# Patient Record
Sex: FEMALE | Race: BLACK OR AFRICAN AMERICAN
[De-identification: names, ages, dates, MRNs, and addresses within clinical notes are randomized per-mention and may not be internally consistent; named-entity substitution may affect disease eponyms.]

---

## 2021-03-10 ENCOUNTER — HOSPITAL ENCOUNTER (EMERGENCY)
Dept: HOSPITAL 19 - COL.ER | Age: 61
Discharge: HOME | End: 2021-03-10
Attending: FAMILY MEDICINE
Payer: MEDICARE

## 2021-03-10 VITALS — TEMPERATURE: 98.5 F

## 2021-03-10 VITALS — HEART RATE: 82 BPM | DIASTOLIC BLOOD PRESSURE: 79 MMHG | SYSTOLIC BLOOD PRESSURE: 145 MMHG

## 2021-03-10 VITALS — HEIGHT: 60.98 IN | WEIGHT: 180.34 LBS | BODY MASS INDEX: 34.05 KG/M2

## 2021-03-10 DIAGNOSIS — R07.9: Primary | ICD-10-CM

## 2021-03-10 DIAGNOSIS — G56.10: ICD-10-CM

## 2021-03-10 DIAGNOSIS — I10: ICD-10-CM

## 2021-03-10 LAB
ALBUMIN SERPL-MCNC: 3.9 GM/DL (ref 3.5–5)
ALP SERPL-CCNC: 83 U/L (ref 50–136)
ALT SERPL-CCNC: 30 U/L (ref 4–34)
ANION GAP SERPL CALC-SCNC: 8 MMOL/L (ref 7–16)
AST SERPL-CCNC: 36 U/L (ref 15–37)
BASOPHILS # BLD: 0 10*3/UL (ref 0–0.2)
BASOPHILS NFR BLD AUTO: 0.5 % (ref 0–2)
BILIRUB SERPL-MCNC: 0.3 MG/DL (ref 0–1)
BUN SERPL-MCNC: 23 MG/DL (ref 7–17)
CALCIUM SERPL-MCNC: 9.2 MG/DL (ref 8.4–10.2)
CHLORIDE SERPL-SCNC: 110 MMOL/L (ref 98–107)
CO2 SERPL-SCNC: 24 MMOL/L (ref 22–30)
CREAT SERPL-SCNC: 0.98 UMOL/L (ref 0.52–1.25)
CRP SERPL-MCNC: < 0.5 MG/DL (ref 0–0.9)
EOSINOPHIL # BLD: 0.3 10*3/UL (ref 0–0.7)
EOSINOPHIL NFR BLD: 3.7 % (ref 0–4)
ERYTHROCYTE [DISTWIDTH] IN BLOOD BY AUTOMATED COUNT: 14 % (ref 11.5–14.5)
GLUCOSE SERPL-MCNC: 107 MG/DL (ref 74–106)
GRANULOCYTES # BLD AUTO: 36.9 % (ref 42.2–75.2)
HCT VFR BLD AUTO: 36 % (ref 37–47)
HGB BLD-MCNC: 11.2 G/DL (ref 12.5–16)
LYMPHOCYTES # BLD: 3.9 10*3/UL (ref 1.2–3.4)
LYMPHOCYTES NFR BLD: 49.7 % (ref 20–51)
MCH RBC QN AUTO: 27 PG (ref 27–31)
MCHC RBC AUTO-ENTMCNC: 31 G/DL (ref 33–37)
MCV RBC AUTO: 88 FL (ref 80–100)
MONOCYTES # BLD: 0.7 10*3/UL (ref 0.1–0.6)
MONOCYTES NFR BLD AUTO: 8.8 % (ref 1.7–9.3)
NEUTROPHILS # BLD: 2.9 10*3/UL (ref 1.4–6.5)
PLATELET # BLD AUTO: 239 K/MM3 (ref 130–400)
PMV BLD AUTO: 11.7 FL (ref 7.4–10.4)
POTASSIUM SERPL-SCNC: 4.1 MMOL/L (ref 3.4–5)
PROT SERPL-MCNC: 7.2 GM/DL (ref 6.4–8.2)
RBC # BLD AUTO: 4.1 M/MM3 (ref 4.1–5.3)
SODIUM SERPL-SCNC: 142 MMOL/L (ref 137–145)

## 2022-02-03 ENCOUNTER — HOSPITAL ENCOUNTER (EMERGENCY)
Dept: HOSPITAL 19 - COL.ER | Age: 62
Discharge: HOME | End: 2022-02-03
Attending: EMERGENCY MEDICINE
Payer: MEDICARE

## 2022-02-03 VITALS — HEART RATE: 81 BPM | SYSTOLIC BLOOD PRESSURE: 156 MMHG | DIASTOLIC BLOOD PRESSURE: 77 MMHG

## 2022-02-03 VITALS — TEMPERATURE: 97.8 F

## 2022-02-03 VITALS — BODY MASS INDEX: 38.18 KG/M2 | HEIGHT: 59.02 IN | WEIGHT: 189.38 LBS

## 2022-02-03 DIAGNOSIS — M54.50: ICD-10-CM

## 2022-02-03 DIAGNOSIS — Z79.899: ICD-10-CM

## 2022-02-03 DIAGNOSIS — R10.32: ICD-10-CM

## 2022-02-03 DIAGNOSIS — I10: ICD-10-CM

## 2022-02-03 DIAGNOSIS — R11.0: ICD-10-CM

## 2022-02-03 DIAGNOSIS — R10.31: Primary | ICD-10-CM

## 2022-02-03 DIAGNOSIS — K21.9: ICD-10-CM

## 2022-02-03 LAB
ALBUMIN SERPL-MCNC: 3.9 GM/DL (ref 3.5–5)
ALP SERPL-CCNC: 61 U/L (ref 40–150)
ALT SERPL-CCNC: 41 U/L (ref 0–55)
ANION GAP SERPL CALC-SCNC: 10 MMOL/L (ref 7–16)
AST SERPL-CCNC: 34 U/L (ref 5–34)
BASOPHILS # BLD: 0 K/MM3 (ref 0–0.2)
BASOPHILS NFR BLD AUTO: 0.1 % (ref 0–2)
BILIRUB SERPL-MCNC: 0.6 MG/DL (ref 0.2–1.2)
BUN SERPL-MCNC: 13 MG/DL (ref 10–20)
CALCIUM SERPL-MCNC: 8.9 MG/DL (ref 8.4–10.2)
CHLORIDE SERPL-SCNC: 106 MMOL/L (ref 98–107)
CO2 SERPL-SCNC: 22 MMOL/L (ref 22–29)
CREAT SERPL-SCNC: 0.9 MG/DL (ref 0.57–1.11)
CRP SERPL-MCNC: 0.45 MG/DL (ref 0–0.5)
EOSINOPHIL # BLD: 0.2 K/MM3 (ref 0–0.7)
EOSINOPHIL NFR BLD: 2.3 % (ref 0–4)
ERYTHROCYTE [DISTWIDTH] IN BLOOD BY AUTOMATED COUNT: 14.4 % (ref 11.5–14.5)
GLUCOSE SERPL-MCNC: 112 MG/DL (ref 70–99)
GRANULOCYTES # BLD AUTO: 61.7 % (ref 42.2–75.2)
HCT VFR BLD AUTO: 42.2 % (ref 37–47)
HGB BLD-MCNC: 13.1 G/DL (ref 12.5–16)
LIPASE SERPL-CCNC: 66 U/L (ref 8–78)
LYMPHOCYTES # BLD: 3.3 K/MM3 (ref 1.2–3.4)
LYMPHOCYTES NFR BLD: 31 % (ref 20–51)
MCH RBC QN AUTO: 27 PG (ref 27–31)
MCHC RBC AUTO-ENTMCNC: 31 G/DL (ref 33–37)
MCV RBC AUTO: 86 FL (ref 80–100)
MONOCYTES # BLD: 0.5 K/MM3 (ref 0.1–0.6)
MONOCYTES NFR BLD AUTO: 4.6 % (ref 1.7–9.3)
NEUTROPHILS # BLD: 6.5 K/MM3 (ref 1.4–6.5)
PH UR STRIP.AUTO: 5 [PH] (ref 5–8)
PLATELET # BLD AUTO: 234 K/MM3 (ref 130–400)
PMV BLD AUTO: 11.6 FL (ref 7.4–10.4)
POTASSIUM SERPL-SCNC: 4 MMOL/L (ref 3.5–4.5)
PROT SERPL-MCNC: 8.2 GM/DL (ref 6.2–8.1)
RBC # BLD AUTO: 4.91 M/MM3 (ref 4.1–5.3)
RBC # UR: (no result) /HPF (ref 0–2)
SODIUM SERPL-SCNC: 138 MMOL/L (ref 136–145)
SP GR UR STRIP.AUTO: 1.01 (ref 1–1.03)
SQUAMOUS # URNS: (no result) /HPF (ref 0–10)
URINE LEUKOCYTE ESTERASE: (no result)
URN COLLECT METHOD CLASS: (no result)

## 2022-05-13 ENCOUNTER — HOSPITAL ENCOUNTER (OUTPATIENT)
Dept: HOSPITAL 19 - COL.ER | Age: 62
Setting detail: OBSERVATION
LOS: 3 days | Discharge: HOME | End: 2022-05-16
Attending: INTERNAL MEDICINE | Admitting: INTERNAL MEDICINE
Payer: MEDICARE

## 2022-05-13 VITALS — BODY MASS INDEX: 34.93 KG/M2 | WEIGHT: 177.91 LBS | HEIGHT: 60 IN

## 2022-05-13 VITALS — SYSTOLIC BLOOD PRESSURE: 145 MMHG | DIASTOLIC BLOOD PRESSURE: 69 MMHG | HEART RATE: 78 BPM | TEMPERATURE: 98.5 F

## 2022-05-13 VITALS — HEART RATE: 67 BPM | DIASTOLIC BLOOD PRESSURE: 64 MMHG | TEMPERATURE: 97.9 F | SYSTOLIC BLOOD PRESSURE: 124 MMHG

## 2022-05-13 DIAGNOSIS — M54.50: ICD-10-CM

## 2022-05-13 DIAGNOSIS — Z91.19: ICD-10-CM

## 2022-05-13 DIAGNOSIS — Z28.310: ICD-10-CM

## 2022-05-13 DIAGNOSIS — I50.30: ICD-10-CM

## 2022-05-13 DIAGNOSIS — Z79.82: ICD-10-CM

## 2022-05-13 DIAGNOSIS — I11.0: ICD-10-CM

## 2022-05-13 DIAGNOSIS — I21.4: Primary | ICD-10-CM

## 2022-05-13 DIAGNOSIS — I42.2: ICD-10-CM

## 2022-05-13 DIAGNOSIS — K21.9: ICD-10-CM

## 2022-05-13 DIAGNOSIS — E78.5: ICD-10-CM

## 2022-05-13 DIAGNOSIS — Z28.9: ICD-10-CM

## 2022-05-13 DIAGNOSIS — Z79.899: ICD-10-CM

## 2022-05-13 DIAGNOSIS — I16.0: ICD-10-CM

## 2022-05-13 DIAGNOSIS — G89.29: ICD-10-CM

## 2022-05-13 LAB
ALBUMIN SERPL-MCNC: 3.6 GM/DL (ref 3.5–5)
ALP SERPL-CCNC: 57 U/L (ref 40–150)
ALT SERPL-CCNC: 21 U/L (ref 0–55)
ANION GAP SERPL CALC-SCNC: 12 MMOL/L (ref 7–16)
APTT PPP: 29.5 SECONDS (ref 26–37)
AST SERPL-CCNC: 29 U/L (ref 5–34)
BILIRUB SERPL-MCNC: 0.6 MG/DL (ref 0.2–1.2)
BUN SERPL-MCNC: 18 MG/DL (ref 10–20)
CALCIUM SERPL-MCNC: 9.2 MG/DL (ref 8.4–10.2)
CHLORIDE SERPL-SCNC: 105 MMOL/L (ref 98–107)
CO2 SERPL-SCNC: 23 MMOL/L (ref 22–29)
CREAT SERPL-SCNC: 0.75 MG/DL (ref 0.57–1.11)
CRP SERPL-MCNC: 0.28 MG/DL (ref 0–0.5)
DEPRECATED D DIMER PPP IA-ACNC: < 200 NG/MLDDU (ref 200–230)
EOSINOPHIL NFR BLD: 1 % (ref 0–4)
ERYTHROCYTE [DISTWIDTH] IN BLOOD BY AUTOMATED COUNT: 14.3 % (ref 11.5–14.5)
GLUCOSE SERPL-MCNC: 94 MG/DL (ref 70–99)
HCT VFR BLD AUTO: 37 % (ref 37–47)
HGB BLD-MCNC: 12.1 G/DL (ref 12.5–16)
INR BLD: 1.1 (ref 0.8–3)
LYMPHOCYTES NFR BLD MANUAL: 56 % (ref 20–51)
MCH RBC QN AUTO: 28 PG (ref 27–31)
MCHC RBC AUTO-ENTMCNC: 33 G/DL (ref 33–37)
MCV RBC AUTO: 84 FL (ref 80–100)
MONOCYTES NFR BLD: 4 % (ref 1.7–9.3)
NEUTS SEG NFR BLD MANUAL: 39 % (ref 42–75.2)
PLATELET # BLD AUTO: 265 K/MM3 (ref 130–400)
PLATELET BLD QL SMEAR: NORMAL
PMV BLD AUTO: 11.5 FL (ref 7.4–10.4)
POTASSIUM SERPL-SCNC: 3.8 MMOL/L (ref 3.5–4.5)
PROT SERPL-MCNC: 8 GM/DL (ref 6.2–8.1)
PROTHROMBIN TIME: 12.9 SECONDS (ref 9.7–12.8)
RBC # BLD AUTO: 4.4 M/MM3 (ref 4.1–5.3)
SODIUM SERPL-SCNC: 140 MMOL/L (ref 136–145)
TROPONIN I SERPL-MCNC: 0.14 NG/ML (ref 0–0.03)

## 2022-05-13 PROCEDURE — G0378 HOSPITAL OBSERVATION PER HR: HCPCS

## 2022-05-13 PROCEDURE — C9113 INJ PANTOPRAZOLE SODIUM, VIA: HCPCS

## 2022-05-14 VITALS — SYSTOLIC BLOOD PRESSURE: 133 MMHG | DIASTOLIC BLOOD PRESSURE: 61 MMHG | TEMPERATURE: 98.6 F | HEART RATE: 59 BPM

## 2022-05-14 VITALS — DIASTOLIC BLOOD PRESSURE: 72 MMHG | TEMPERATURE: 97.9 F | SYSTOLIC BLOOD PRESSURE: 154 MMHG | HEART RATE: 67 BPM

## 2022-05-14 VITALS — DIASTOLIC BLOOD PRESSURE: 72 MMHG | TEMPERATURE: 97.6 F | HEART RATE: 85 BPM | SYSTOLIC BLOOD PRESSURE: 126 MMHG

## 2022-05-14 VITALS — SYSTOLIC BLOOD PRESSURE: 154 MMHG | TEMPERATURE: 97.4 F | DIASTOLIC BLOOD PRESSURE: 72 MMHG | HEART RATE: 74 BPM

## 2022-05-14 VITALS — SYSTOLIC BLOOD PRESSURE: 110 MMHG | TEMPERATURE: 97.8 F | DIASTOLIC BLOOD PRESSURE: 53 MMHG | HEART RATE: 56 BPM

## 2022-05-14 VITALS — HEART RATE: 73 BPM | DIASTOLIC BLOOD PRESSURE: 82 MMHG | SYSTOLIC BLOOD PRESSURE: 135 MMHG | TEMPERATURE: 98.1 F

## 2022-05-14 VITALS — TEMPERATURE: 98.3 F | SYSTOLIC BLOOD PRESSURE: 151 MMHG | DIASTOLIC BLOOD PRESSURE: 66 MMHG | HEART RATE: 62 BPM

## 2022-05-14 LAB
ANION GAP SERPL CALC-SCNC: 12 MMOL/L (ref 7–16)
BASOPHILS # BLD: 0 K/MM3 (ref 0–0.2)
BASOPHILS NFR BLD AUTO: 0.5 % (ref 0–2)
BUN SERPL-MCNC: 14 MG/DL (ref 10–20)
CALCIUM SERPL-MCNC: 8.5 MG/DL (ref 8.4–10.2)
CHLORIDE SERPL-SCNC: 106 MMOL/L (ref 98–107)
CO2 SERPL-SCNC: 21 MMOL/L (ref 22–29)
CREAT SERPL-SCNC: 0.81 MG/DL (ref 0.57–1.11)
EOSINOPHIL # BLD: 0.1 K/MM3 (ref 0–0.7)
EOSINOPHIL NFR BLD: 1.8 % (ref 0–4)
ERYTHROCYTE [DISTWIDTH] IN BLOOD BY AUTOMATED COUNT: 14.6 % (ref 11.5–14.5)
GLUCOSE SERPL-MCNC: 134 MG/DL (ref 70–99)
GRANULOCYTES # BLD AUTO: 37.9 % (ref 42.2–75.2)
HCT VFR BLD AUTO: 37.3 % (ref 37–47)
HGB BLD-MCNC: 11.3 G/DL (ref 12.5–16)
LYMPHOCYTES # BLD: 3.8 K/MM3 (ref 1.2–3.4)
LYMPHOCYTES NFR BLD: 52.1 % (ref 20–51)
MCH RBC QN AUTO: 28 PG (ref 27–31)
MCHC RBC AUTO-ENTMCNC: 30 G/DL (ref 33–37)
MCV RBC AUTO: 92 FL (ref 80–100)
MONOCYTES # BLD: 0.5 K/MM3 (ref 0.1–0.6)
MONOCYTES NFR BLD AUTO: 7.4 % (ref 1.7–9.3)
NEUTROPHILS # BLD: 2.8 K/MM3 (ref 1.4–6.5)
PLATELET # BLD AUTO: 233 K/MM3 (ref 130–400)
PMV BLD AUTO: 12.3 FL (ref 7.4–10.4)
POTASSIUM SERPL-SCNC: 3.5 MMOL/L (ref 3.5–4.5)
RBC # BLD AUTO: 4.07 M/MM3 (ref 4.1–5.3)
SODIUM SERPL-SCNC: 139 MMOL/L (ref 136–145)

## 2022-05-14 NOTE — NUR
Sitting up in bed eating dinner. Complained of chest pain x1. Received nitro
x1 with a little relief. Received protonix and tums with pain resolution.
Heparin gtt continues to infuse @7ml/hr. Denies current needs.  Call light in
reach. Will monitor.

## 2022-05-14 NOTE — NUR
Patient is resting in bed, alert and oriented x 4, VSS. Denies chest pain at
the moment. Telemetry in place NSR. Hep gtt 7ml/hr At goal. Assessment
completed, meds provide no other needs at this time. Call light within reach

## 2022-05-14 NOTE — NUR
Called with c/o chest pain. Rating pain 6/10 to mid sternum-described as sharp
pains. VS WNL. EKG complete and on chart. Spoke with Dr Sow and new orders
received and initiated.

## 2022-05-15 VITALS — TEMPERATURE: 98.5 F | DIASTOLIC BLOOD PRESSURE: 63 MMHG | SYSTOLIC BLOOD PRESSURE: 125 MMHG | HEART RATE: 63 BPM

## 2022-05-15 VITALS — TEMPERATURE: 98.3 F | DIASTOLIC BLOOD PRESSURE: 68 MMHG | SYSTOLIC BLOOD PRESSURE: 144 MMHG | HEART RATE: 62 BPM

## 2022-05-15 VITALS — HEART RATE: 78 BPM | TEMPERATURE: 98.1 F | DIASTOLIC BLOOD PRESSURE: 88 MMHG | SYSTOLIC BLOOD PRESSURE: 151 MMHG

## 2022-05-15 VITALS — TEMPERATURE: 97.8 F | HEART RATE: 63 BPM | DIASTOLIC BLOOD PRESSURE: 67 MMHG | SYSTOLIC BLOOD PRESSURE: 108 MMHG

## 2022-05-15 VITALS — DIASTOLIC BLOOD PRESSURE: 48 MMHG | HEART RATE: 61 BPM | TEMPERATURE: 99.4 F | SYSTOLIC BLOOD PRESSURE: 116 MMHG

## 2022-05-15 VITALS — DIASTOLIC BLOOD PRESSURE: 74 MMHG | TEMPERATURE: 98.3 F | HEART RATE: 79 BPM | SYSTOLIC BLOOD PRESSURE: 157 MMHG

## 2022-05-15 NOTE — NUR
Assessment complete. A&Ox3. Denies pain/chest pain/nausea/shortness of breath.
VS remain stable. Tele reporting SB. Right forearm IV with Heparin infusing at
700units/hour. Left forearm INT flushes without difficutly. Plan of care
discussed for this shift to inclue meds/heparin gtt/calling for
questions/concerns. Verbalizes understanding. Call light in reach. Will
monitor. ADMIT

## 2022-05-15 NOTE — NUR
Called with c/o heartburn from lunch meal. Mylanta given per dr order. Denies
chest pain/nausea/shortness of breath. Denies current needs. Call light in
reach. Will monitor.

## 2022-05-15 NOTE — NUR
Patient had an unevenful day. Denied pain/chest pain/shortness of
breath/nausea. VS remained stable. Heparin infusing @600units/hour to right
forearm IV without difficulty. Next HepXa due at 1900. Discussed NPO at
midnight tonight for cards procedure tomorrow. Denies questions/concerns. Call
light in reach. Will monitor.

## 2022-05-16 VITALS — HEART RATE: 55 BPM | TEMPERATURE: 98 F | DIASTOLIC BLOOD PRESSURE: 67 MMHG | SYSTOLIC BLOOD PRESSURE: 104 MMHG

## 2022-05-16 VITALS — TEMPERATURE: 98.4 F | HEART RATE: 60 BPM | SYSTOLIC BLOOD PRESSURE: 134 MMHG | DIASTOLIC BLOOD PRESSURE: 66 MMHG

## 2022-05-16 VITALS — HEART RATE: 57 BPM | SYSTOLIC BLOOD PRESSURE: 131 MMHG | TEMPERATURE: 98.6 F | DIASTOLIC BLOOD PRESSURE: 65 MMHG

## 2022-05-16 LAB
ANION GAP SERPL CALC-SCNC: 11 MMOL/L (ref 7–16)
BUN SERPL-MCNC: 10 MG/DL (ref 10–20)
CALCIUM SERPL-MCNC: 8.6 MG/DL (ref 8.4–10.2)
CHLORIDE SERPL-SCNC: 106 MMOL/L (ref 98–107)
CO2 SERPL-SCNC: 25 MMOL/L (ref 22–29)
CREAT SERPL-SCNC: 0.88 MG/DL (ref 0.57–1.11)
GLUCOSE SERPL-MCNC: 105 MG/DL (ref 70–99)
MAGNESIUM SERPL-MCNC: 2 MG/DL (ref 1.6–2.6)
POTASSIUM SERPL-SCNC: 4.2 MMOL/L (ref 3.5–4.5)
SODIUM SERPL-SCNC: 142 MMOL/L (ref 136–145)

## 2022-05-16 NOTE — NUR
The patient is to discharge back home today, 5/16. SW met with the patient and
presented and read the IM form outloud to the patient. The patient verbalized
understanding and of discharge today. She signed the form and declined a copy.
No additional needs at this time.

## 2022-05-16 NOTE — NUR
PT LAYING SUPINE IN BED. PT STATES THAT SHE IS NOT WANTING TO TAKE ANY OF HER
MEDICATIONS THIS AM BECAUSE SHE HAS BEEN NPO AND DOES NOT WANT TO TAKE THEM ON
AN EMPTY STOMACH SO THEY DO NOT CAUSE ANY N/V. MEDICATIONS WERE HELD PER HER
REQUEST. PT STATES THAT SHE IS NOT HAVING ANY CHEST PAIN OR DISCOMFORT AT THIS
TIME. NO SOB OR DIZZINES. "IM JUST WAITING TO SEE WHAT THEY ARE GOING TO DO."
CALL LIGHT IS WITHIN REACH.

## 2022-05-16 NOTE — NUR
ASSUMED CARE OF PATIENT AFTER RECEIVING BEDSIDE REPORT. ASSESSMENT COMPLETED,
VSS. PATIENT DENIES ANY CHEST PAIN OR RELATED SYMPTOMS. PATIENT DENIES
QUESTIONS, COMPLAINTS, AND CONCERNS. PATIENT NPO AFTER MIDNIGHT. NO ACUTE
EVENTS OVERNIGHT. BEDSIDE REPORT TO BE GIVEN TO ONCOMING SHIFT.

## 2022-05-26 ENCOUNTER — HOSPITAL ENCOUNTER (EMERGENCY)
Dept: HOSPITAL 19 - COL.ER | Age: 62
Discharge: HOME | End: 2022-05-26
Attending: EMERGENCY MEDICINE
Payer: MEDICARE

## 2022-05-26 VITALS — SYSTOLIC BLOOD PRESSURE: 144 MMHG | HEART RATE: 76 BPM | DIASTOLIC BLOOD PRESSURE: 78 MMHG

## 2022-05-26 VITALS — HEIGHT: 67.99 IN | WEIGHT: 190.48 LBS | BODY MASS INDEX: 28.87 KG/M2

## 2022-05-26 DIAGNOSIS — Z28.310: ICD-10-CM

## 2022-05-26 DIAGNOSIS — S10.80XA: Primary | ICD-10-CM

## 2022-05-26 DIAGNOSIS — S40.021A: ICD-10-CM
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
## 2022-05-26 DIAGNOSIS — S50.11XA: ICD-10-CM
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
## 2022-07-05 ENCOUNTER — HOSPITAL ENCOUNTER (OUTPATIENT)
Dept: HOSPITAL 19 - COL.ER | Age: 62
Setting detail: OBSERVATION
LOS: 2 days | Discharge: HOME | End: 2022-07-07
Attending: INTERNAL MEDICINE | Admitting: INTERNAL MEDICINE
Payer: MEDICARE

## 2022-07-05 VITALS — BODY MASS INDEX: 36.1 KG/M2 | HEIGHT: 60 IN | WEIGHT: 183.87 LBS

## 2022-07-05 VITALS — SYSTOLIC BLOOD PRESSURE: 188 MMHG | HEART RATE: 63 BPM | DIASTOLIC BLOOD PRESSURE: 74 MMHG | TEMPERATURE: 98.3 F

## 2022-07-05 DIAGNOSIS — Z28.9: ICD-10-CM

## 2022-07-05 DIAGNOSIS — I42.2: ICD-10-CM

## 2022-07-05 DIAGNOSIS — E78.5: ICD-10-CM

## 2022-07-05 DIAGNOSIS — M54.50: ICD-10-CM

## 2022-07-05 DIAGNOSIS — Z79.899: ICD-10-CM

## 2022-07-05 DIAGNOSIS — G89.29: ICD-10-CM

## 2022-07-05 DIAGNOSIS — I21.A1: Primary | ICD-10-CM

## 2022-07-05 DIAGNOSIS — R07.9: ICD-10-CM

## 2022-07-05 DIAGNOSIS — Z28.310: ICD-10-CM

## 2022-07-05 DIAGNOSIS — K21.9: ICD-10-CM

## 2022-07-05 DIAGNOSIS — Z79.82: ICD-10-CM

## 2022-07-05 DIAGNOSIS — I11.0: ICD-10-CM

## 2022-07-05 DIAGNOSIS — I50.30: ICD-10-CM

## 2022-07-05 DIAGNOSIS — E87.6: ICD-10-CM

## 2022-07-05 LAB
ALBUMIN SERPL-MCNC: 4 GM/DL (ref 3.4–4.8)
ALP SERPL-CCNC: 78 U/L (ref 40–150)
ALT SERPL-CCNC: 50 U/L (ref 0–55)
ANION GAP SERPL CALC-SCNC: 16 MMOL/L (ref 7–16)
APTT PPP: 33.3 SECONDS (ref 26–37)
AST SERPL-CCNC: 34 U/L (ref 5–34)
BILIRUB SERPL-MCNC: 0.6 MG/DL (ref 0.2–1.2)
BUN SERPL-MCNC: 18 MG/DL (ref 10–20)
CALCIUM SERPL-MCNC: 9.7 MG/DL (ref 8.4–10.2)
CHLORIDE SERPL-SCNC: 105 MMOL/L (ref 98–107)
CO2 SERPL-SCNC: 22 MMOL/L (ref 23–31)
CREAT SERPL-SCNC: 0.91 MG/DL (ref 0.57–1.11)
EOSINOPHIL NFR BLD: 1 % (ref 0–4)
ERYTHROCYTE [DISTWIDTH] IN BLOOD BY AUTOMATED COUNT: 13.8 % (ref 11.5–14.5)
ETHANOL SPEC-SCNC: < 10 MG/DL (ref 0–10)
GLUCOSE SERPL-MCNC: 93 MG/DL (ref 70–99)
HCT VFR BLD AUTO: 38.1 % (ref 37–47)
HGB BLD-MCNC: 12 G/DL (ref 12.5–16)
INR BLD: 1.2 (ref 0.8–3)
LYMPHOCYTES NFR BLD MANUAL: 49 % (ref 20–51)
MCH RBC QN AUTO: 27 PG (ref 27–31)
MCHC RBC AUTO-ENTMCNC: 32 G/DL (ref 33–37)
MCV RBC AUTO: 86 FL (ref 80–100)
MONOCYTES NFR BLD: 3 % (ref 1.7–9.3)
NEUTS SEG NFR BLD MANUAL: 47 % (ref 42–75.2)
PLATELET # BLD AUTO: 322 K/MM3 (ref 130–400)
PLATELET BLD QL SMEAR: NORMAL
PMV BLD AUTO: 11.7 FL (ref 7.4–10.4)
POTASSIUM SERPL-SCNC: 4 MMOL/L (ref 3.5–4.5)
PROT SERPL-MCNC: 8.8 GM/DL (ref 6.2–8.1)
PROTHROMBIN TIME: 13.9 SECONDS (ref 9.7–12.8)
RBC # BLD AUTO: 4.45 M/MM3 (ref 4.1–5.3)
SODIUM SERPL-SCNC: 143 MMOL/L (ref 136–145)
TROPONIN I SERPL-MCNC: 0.07 NG/ML (ref 0–0.03)

## 2022-07-05 PROCEDURE — G0378 HOSPITAL OBSERVATION PER HR: HCPCS

## 2022-07-06 VITALS — HEART RATE: 63 BPM | DIASTOLIC BLOOD PRESSURE: 66 MMHG | SYSTOLIC BLOOD PRESSURE: 162 MMHG | TEMPERATURE: 99.7 F

## 2022-07-06 VITALS — SYSTOLIC BLOOD PRESSURE: 164 MMHG | HEART RATE: 59 BPM | DIASTOLIC BLOOD PRESSURE: 64 MMHG

## 2022-07-06 VITALS — SYSTOLIC BLOOD PRESSURE: 126 MMHG | HEART RATE: 58 BPM | DIASTOLIC BLOOD PRESSURE: 59 MMHG | TEMPERATURE: 98.5 F

## 2022-07-06 VITALS — SYSTOLIC BLOOD PRESSURE: 168 MMHG | DIASTOLIC BLOOD PRESSURE: 72 MMHG

## 2022-07-06 VITALS — DIASTOLIC BLOOD PRESSURE: 72 MMHG | TEMPERATURE: 98.4 F | HEART RATE: 62 BPM | SYSTOLIC BLOOD PRESSURE: 164 MMHG

## 2022-07-06 VITALS — TEMPERATURE: 98.5 F | HEART RATE: 60 BPM | DIASTOLIC BLOOD PRESSURE: 74 MMHG | SYSTOLIC BLOOD PRESSURE: 139 MMHG

## 2022-07-06 VITALS — HEART RATE: 65 BPM | DIASTOLIC BLOOD PRESSURE: 72 MMHG | SYSTOLIC BLOOD PRESSURE: 171 MMHG | TEMPERATURE: 98.8 F

## 2022-07-06 VITALS — SYSTOLIC BLOOD PRESSURE: 160 MMHG | DIASTOLIC BLOOD PRESSURE: 77 MMHG | HEART RATE: 60 BPM | TEMPERATURE: 98.3 F

## 2022-07-06 LAB
ALBUMIN SERPL-MCNC: 3.5 GM/DL (ref 3.4–4.8)
ALP SERPL-CCNC: 76 U/L (ref 40–150)
ALT SERPL-CCNC: 42 U/L (ref 0–55)
ANION GAP SERPL CALC-SCNC: 12 MMOL/L (ref 7–16)
AST SERPL-CCNC: 33 U/L (ref 5–34)
BASOPHILS # BLD: 0 K/MM3 (ref 0–0.2)
BASOPHILS NFR BLD AUTO: 0.5 % (ref 0–2)
BILIRUB SERPL-MCNC: 0.7 MG/DL (ref 0.2–1.2)
BUN SERPL-MCNC: 13 MG/DL (ref 10–20)
CALCIUM SERPL-MCNC: 9.1 MG/DL (ref 8.4–10.2)
CHLORIDE SERPL-SCNC: 104 MMOL/L (ref 98–107)
CO2 SERPL-SCNC: 24 MMOL/L (ref 23–31)
CREAT SERPL-SCNC: 0.84 MG/DL (ref 0.57–1.11)
EOSINOPHIL # BLD: 0.2 K/MM3 (ref 0–0.7)
EOSINOPHIL NFR BLD: 2.5 % (ref 0–4)
ERYTHROCYTE [DISTWIDTH] IN BLOOD BY AUTOMATED COUNT: 13.8 % (ref 11.5–14.5)
GLUCOSE SERPL-MCNC: 86 MG/DL (ref 70–99)
GRANULOCYTES # BLD AUTO: 41.3 % (ref 42.2–75.2)
HCT VFR BLD AUTO: 36.1 % (ref 37–47)
HGB BLD-MCNC: 11.4 G/DL (ref 12.5–16)
LYMPHOCYTES # BLD: 3.9 K/MM3 (ref 1.2–3.4)
LYMPHOCYTES NFR BLD: 49.2 % (ref 20–51)
MCH RBC QN AUTO: 27 PG (ref 27–31)
MCHC RBC AUTO-ENTMCNC: 32 G/DL (ref 33–37)
MCV RBC AUTO: 86 FL (ref 80–100)
MONOCYTES # BLD: 0.5 K/MM3 (ref 0.1–0.6)
MONOCYTES NFR BLD AUTO: 6.2 % (ref 1.7–9.3)
NEUTROPHILS # BLD: 3.3 K/MM3 (ref 1.4–6.5)
PLATELET # BLD AUTO: 288 K/MM3 (ref 130–400)
PMV BLD AUTO: 12 FL (ref 7.4–10.4)
POTASSIUM SERPL-SCNC: 3.3 MMOL/L (ref 3.5–4.5)
PROT SERPL-MCNC: 7.7 GM/DL (ref 6.2–8.1)
RBC # BLD AUTO: 4.22 M/MM3 (ref 4.1–5.3)
SODIUM SERPL-SCNC: 140 MMOL/L (ref 136–145)
TROPONIN I SERPL-MCNC: 0.07 NG/ML (ref 0–0.03)

## 2022-07-06 NOTE — NUR
Pt doing well this afternoon.  No complaints of pain at this time.  She has
tolerated heart healthy diet with no issues.  All questions answered, call
light within reach

## 2022-07-06 NOTE — NUR
Initial visit; Patient thanked  for visiting her and states she is
doing well and waiting for her lunch.  was pleased to hear that
patient is doing well and advised her to continue to do what she is doing! She
smiled and thanked .

## 2022-07-06 NOTE — NUR
Pt reports she is doing okay this morning.  She does have complaints of a
headache, notified Kristel DAVIS for Tylenol order.  Discussed with cardiology
pt home medications, okay to give at this time.  Potassium level as well as
troponin level was reported to Kristel DAVIS earlier this am.  Potassium being
replaced per protocol.  Pt having complaints of it burning, turned down to
50ml/hr for comfort and gave ice pack.  Cardiology reported they would be by
soon to see her

## 2022-07-06 NOTE — NUR
Pt having complaints of chest pain, BP slightly elevated, pulse stable.
Notified SUSAN Menendez with cardiology.  Had pt go ahead and take her morning
medications.  Pt refused to take them earlier stating that it will make her
stomach upset on an empty stomach.  Informed her that if that happens I can
get her some nausea medications.

## 2022-07-06 NOTE — NUR
met with patient to discuss discharge planning. Patient lives
alone in [place] and advised her daughter, Bambi (ph#[phone]) lives
nearby. Patient sees Ivory Henderson for primary care and obtains medications
from [facility] with no difficulties. Patient is independent with ADLS and
does not use any DME. Patient does not have Advance Directives and plans to
return home at time of discharge.
 
Discharge Plan: Home

## 2022-07-06 NOTE — NUR
PATIENT UP TO ROOM 329. ALERT AND ORIENTED, SLID OVER TO BED FROM STRETCHER
WITHOUT ISSUE. MED RX COMPLETED. ADMISSION ASSESSMENTS COMPLETED. CALL PLACED
TO POOJA DAVIS, TO NOTIFY OF ARRIVAL AND BP ELEVATION. ORDER TO RESTART HOME
METOPROLOL NOW. METOPROLOL GIVEN AND POOJA DAVIS IN ROOM TO SEE PATIENT. DISCUSSED
CASE WITH POOJA DAVIS AND ADDITIONAL 25 MG OF METOPROLOL ORDERED AND GIVEN. PATIENT
TOLERATED X2 JELLOS AND SOME WATER. REMAINS NPO SINCE MIDNIGHT FOR CARDS
CONSULT IN AM.

## 2022-07-07 VITALS — HEART RATE: 59 BPM | DIASTOLIC BLOOD PRESSURE: 75 MMHG | SYSTOLIC BLOOD PRESSURE: 138 MMHG | TEMPERATURE: 99.1 F

## 2022-07-07 VITALS — HEART RATE: 53 BPM | SYSTOLIC BLOOD PRESSURE: 125 MMHG | DIASTOLIC BLOOD PRESSURE: 70 MMHG

## 2022-07-07 VITALS — SYSTOLIC BLOOD PRESSURE: 124 MMHG | DIASTOLIC BLOOD PRESSURE: 71 MMHG | HEART RATE: 54 BPM | TEMPERATURE: 98.7 F

## 2022-07-07 LAB
ALBUMIN SERPL-MCNC: 3.6 GM/DL (ref 3.4–4.8)
ALP SERPL-CCNC: 85 U/L (ref 40–150)
ALT SERPL-CCNC: 38 U/L (ref 0–55)
ANION GAP SERPL CALC-SCNC: 10 MMOL/L (ref 7–16)
AST SERPL-CCNC: 25 U/L (ref 5–34)
BASOPHILS # BLD: 0 K/MM3 (ref 0–0.2)
BASOPHILS NFR BLD AUTO: 0.5 % (ref 0–2)
BILIRUB SERPL-MCNC: 0.4 MG/DL (ref 0.2–1.2)
BUN SERPL-MCNC: 18 MG/DL (ref 10–20)
CALCIUM SERPL-MCNC: 9.4 MG/DL (ref 8.4–10.2)
CHLORIDE SERPL-SCNC: 105 MMOL/L (ref 98–107)
CO2 SERPL-SCNC: 24 MMOL/L (ref 23–31)
CREAT SERPL-SCNC: 1.04 MG/DL (ref 0.57–1.11)
EOSINOPHIL # BLD: 0.2 K/MM3 (ref 0–0.7)
EOSINOPHIL NFR BLD: 2.5 % (ref 0–4)
ERYTHROCYTE [DISTWIDTH] IN BLOOD BY AUTOMATED COUNT: 13.7 % (ref 11.5–14.5)
GLUCOSE SERPL-MCNC: 109 MG/DL (ref 70–99)
GRANULOCYTES # BLD AUTO: 46.1 % (ref 42.2–75.2)
HCT VFR BLD AUTO: 38 % (ref 37–47)
HGB BLD-MCNC: 12 G/DL (ref 12.5–16)
LYMPHOCYTES # BLD: 3.7 K/MM3 (ref 1.2–3.4)
LYMPHOCYTES NFR BLD: 44.4 % (ref 20–51)
MCH RBC QN AUTO: 27 PG (ref 27–31)
MCHC RBC AUTO-ENTMCNC: 32 G/DL (ref 33–37)
MCV RBC AUTO: 87 FL (ref 80–100)
MONOCYTES # BLD: 0.5 K/MM3 (ref 0.1–0.6)
MONOCYTES NFR BLD AUTO: 6.3 % (ref 1.7–9.3)
NEUTROPHILS # BLD: 3.9 K/MM3 (ref 1.4–6.5)
PLATELET # BLD AUTO: 293 K/MM3 (ref 130–400)
PMV BLD AUTO: 11.5 FL (ref 7.4–10.4)
POTASSIUM SERPL-SCNC: 4 MMOL/L (ref 3.5–4.5)
PROT SERPL-MCNC: 7.8 GM/DL (ref 6.2–8.1)
RBC # BLD AUTO: 4.38 M/MM3 (ref 4.1–5.3)
SODIUM SERPL-SCNC: 139 MMOL/L (ref 136–145)

## 2022-07-07 NOTE — NUR
PATIENT IN BED ON ROOM ENTRY AT SHIFT CHANGE. ALERT AND ORIENTED. HS MEDS PER
EMAR. PATIENT REQUESTED BEDCHANGE AND LINEN CHANGE WAS COMPLETE. PATIENT
SHOWERED INDEPENDENTLY WITH SETUP ASSISTANCE. DENIES ADDITIONAL NEEDS.

## 2022-07-07 NOTE — NUR
DISCHARGE INSTRUCTIONS REVIEWED WITH PT. QUESTIONS SOLICITED AND ANSWERED. PT
LEFT UNIT IN WHEEL CHAIR WITH STAFF.

## 2022-07-07 NOTE — NUR
PATIENT WILL BE DISCHARGE TODAY. PATIENT IS AWARE AND INFORM HER FAMILY. SHE
STATED THAT FAMILY WILL BE HERE AROUND 2 PM. IV SITE WAS DISCONTINUE (R) AC
AND R(HAND) NO BLEEDING NOTED. NO PAIN NOTED. PATIENT DID AS FOR TYLENOL
AROUND 1100AM. VITALS WAS STABLE AT THAT TIME. HEADACHE DECREASE TO FROM HARD
TO MILD NOW(PER PATIENT)

## 2022-07-07 NOTE — NUR
ASSESSMENT DONE, PATIENT ALERTX 4 WITH NO ISSUE OF CONFUSION.PATIENT IS A FULL
CODE. NO PAIN AT THIS TIME. LUNG CLEAR IN ALL LOBES. BOWEL SOUND ACTIVE IN ALL
4 QUAD. IV SITE ON RIGHT AC AND RIGHT HAND. PER ORDER ,PATIENT POSSIBLE
DISCHARGE TODAY. PATIENT TOLERATED MEDICATION WITH OUT ANY ISSUE. CALL LIGHT
IN REACH. INSTRUCTED TO CALL IF THERE IS ANY CONCERN OR QUESTION